# Patient Record
Sex: FEMALE | Race: WHITE | URBAN - NONMETROPOLITAN AREA
[De-identification: names, ages, dates, MRNs, and addresses within clinical notes are randomized per-mention and may not be internally consistent; named-entity substitution may affect disease eponyms.]

---

## 2021-12-20 ENCOUNTER — OFFICE VISIT (OUTPATIENT)
Dept: URBAN - NONMETROPOLITAN AREA CLINIC 4 | Facility: CLINIC | Age: 56
End: 2021-12-20

## 2022-01-11 ENCOUNTER — OFFICE VISIT (OUTPATIENT)
Dept: URBAN - NONMETROPOLITAN AREA CLINIC 4 | Facility: CLINIC | Age: 57
End: 2022-01-11

## 2022-04-12 ENCOUNTER — OFFICE VISIT (OUTPATIENT)
Dept: URBAN - NONMETROPOLITAN AREA CLINIC 4 | Facility: CLINIC | Age: 57
End: 2022-04-12
Payer: COMMERCIAL

## 2022-04-12 ENCOUNTER — DASHBOARD ENCOUNTERS (OUTPATIENT)
Age: 57
End: 2022-04-12

## 2022-04-12 ENCOUNTER — WEB ENCOUNTER (OUTPATIENT)
Dept: URBAN - NONMETROPOLITAN AREA CLINIC 4 | Facility: CLINIC | Age: 57
End: 2022-04-12

## 2022-04-12 VITALS
WEIGHT: 202.8 LBS | BODY MASS INDEX: 33.79 KG/M2 | SYSTOLIC BLOOD PRESSURE: 127 MMHG | HEIGHT: 65 IN | DIASTOLIC BLOOD PRESSURE: 81 MMHG | TEMPERATURE: 97.2 F | HEART RATE: 72 BPM

## 2022-04-12 DIAGNOSIS — I10 HYPERTENSION, UNSPECIFIED TYPE: ICD-10-CM

## 2022-04-12 DIAGNOSIS — Z85.3 HISTORY OF BREAST CANCER: ICD-10-CM

## 2022-04-12 DIAGNOSIS — E11.9 TYPE 2 DIABETES MELLITUS WITHOUT COMPLICATION, WITHOUT LONG-TERM CURRENT USE OF INSULIN: ICD-10-CM

## 2022-04-12 DIAGNOSIS — K86.2 PANCREATIC CYST: ICD-10-CM

## 2022-04-12 DIAGNOSIS — K76.0 HEPATIC STEATOSIS: ICD-10-CM

## 2022-04-12 PROBLEM — 38341003: Status: ACTIVE | Noted: 2022-04-12

## 2022-04-12 PROBLEM — 429087003: Status: ACTIVE | Noted: 2022-04-12

## 2022-04-12 PROBLEM — 313436004: Status: ACTIVE | Noted: 2022-04-12

## 2022-04-12 PROBLEM — 197321007: Status: ACTIVE | Noted: 2022-04-12

## 2022-04-12 PROCEDURE — 99204 OFFICE O/P NEW MOD 45 MIN: CPT | Performed by: REGISTERED NURSE

## 2022-04-12 NOTE — HPI-TODAY'S VISIT:
4/12/22: Pt is a 55 yo female with PMH of DM II, HTN, HLD, L breast ca s/p lumpectomy in 2017, s/p CCY in 2017 who is self referred for evaluation of pancreatic cyst.  Pt had MRI in 2019 that revealed a stable tiny 3 mm cyst within the pancreatic body. No PD dilatation or solid mass. Mild to moderate hepatic steatosis. No FHx of pancreatic ca. Last cscope Jan 2017 by [facility] was normal. No FHx of CRC or polyps. She denies any current GI complaints.

## 2024-08-01 ENCOUNTER — LAB OUTSIDE AN ENCOUNTER (OUTPATIENT)
Dept: URBAN - METROPOLITAN AREA TELEHEALTH 2 | Facility: TELEHEALTH | Age: 59
End: 2024-08-01

## 2024-08-01 ENCOUNTER — OFFICE VISIT (OUTPATIENT)
Dept: URBAN - METROPOLITAN AREA TELEHEALTH 2 | Facility: TELEHEALTH | Age: 59
End: 2024-08-01
Payer: COMMERCIAL

## 2024-08-01 DIAGNOSIS — K76.0 HEPATIC STEATOSIS: ICD-10-CM

## 2024-08-01 DIAGNOSIS — K86.2 PANCREATIC CYST: ICD-10-CM

## 2024-08-01 DIAGNOSIS — E11.9 TYPE 2 DIABETES MELLITUS WITHOUT COMPLICATION, WITHOUT LONG-TERM CURRENT USE OF INSULIN: ICD-10-CM

## 2024-08-01 PROCEDURE — 99212 OFFICE O/P EST SF 10 MIN: CPT | Performed by: INTERNAL MEDICINE

## 2024-08-01 NOTE — HPI-TODAY'S VISIT:
4/12/22: Pt is a 57 yo female with PMH of DM II, HTN, HLD, L breast ca s/p lumpectomy in 2017, s/p CCY in 2017 who is self referred for evaluation of pancreatic cyst.  Pt had MRI in 2019 that revealed a stable tiny 3 mm cyst within the pancreatic body. No PD dilatation or solid mass. Mild to moderate hepatic steatosis. No FHx of pancreatic ca. Last cscope Jan 2017 by [facility] was normal. No FHx of CRC or polyps. She denies any current GI complaints.  8-1-24 Pt report that she is doing well.  Pt report that she was following up on her MRI request.  Pt reports no changes in fhx. Here for surveillance.